# Patient Record
(demographics unavailable — no encounter records)

---

## 2024-10-23 NOTE — HISTORY OF PRESENT ILLNESS
[Current] : current [TextBox_4] : She continues to lose weight.  She has a poor appetite.  She eats multiple small meals every day.  She has a chronic cough, no hemoptysis.  Remains on Trelegy.  No constitutional symptoms.  Has followed up with ID, no specific therapy prescribed as of yet.  [Difficulty Initiating Sleep] : does not have difficulty initiating sleep [Difficulty Maintaining Sleep] : does not have difficulty maintaining sleep

## 2024-10-23 NOTE — DISCUSSION/SUMMARY
[FreeTextEntry1] : She is a 76 year-old smoker with a history of hypertension, hyperlipidemia and severe COPD.   She was found to have pulmonary nodules on a CT of the abdomen done for right lower quadrant pain.  CT chest demonstrated a 1.4 cm spiculated nodule in the RUL and a 1.1 cm spiculated nodule in the left upper lobe.  The nodules were metabolically active on a PET/CT. CT guided biopsy posed a high risk of pneumothorax.  Robotic assisted navigational bronchoscopy was performed on 12/20/22.  The lavage and biopsy were both negative for malignant cells.  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  Since that time sputum specimens have demonstrated other nontuberculous mycobacteria, see the reports. CT Chest 3/28/23: Multiple new nodules noted in the RUL. Also RUL nodule increased to 1.4 cm, was 9 mm. Other nodules, up to 1.0 cm, remain stable. CT Chest 5/18/23: Decrease in right upper lobe opacities.  New 8 mm left upper lobe nodule.  Decrease in 1.1 cm right upper lobe nodule.  Other nodules unchanged in size. CT chest 12/29/2023: Decrease in size of the right upper lobe nodular opacities.  Right upper lobe nodular opacity stable.  Other smaller nodules also stable.  Centrilobular emphysema noted.  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

## 2024-10-23 NOTE — PROCEDURE
[FreeTextEntry1] : PFT 8/10/18:Severe obstruction. There was air trapping. Diffusion was mildly reduced. No significant improvement was noted after inhalation of a bronchodilator.  PFT 4/13/18: There was moderate obstructive airways disease. Improvement was noted after administration of a bronchodilator. There was air trapping. There was a mild reduction in diffusion.  CT Chest 2/24/17: The report indicates a 2 mm granuloma in the left lung base. In addition there were other pulmonary nodules all measuring between 2 and 4 mm. A followup CT at one-year interval was advised.  CT Chest  6/27/18: Multiple small nodules are noted. The largest was 4 mm. A followup examination in 12 months was advised.  CT Chest 6/20/19: A 4 mm nodule in the left upper lobe. Unchanged when compared to the prior. Other small nodules also present.   CT Chest 10/3/20: Moderate to severe emphysematous changes. Small pulmonary nodules, stable.   CT Chest 10/8/22: 1.4 spiculated nodule in the right upper lobe.  1.1 cm spiculated nodule in the left upper lobe.  Moderate to severe emphysematous changes.  CT Chest 12/16/22: 10 mm nodule in the right upper lobe, stable.  8 mm nodule in the left upper lobe, stable new smaller nodules. No adenopathy.   PET/CT 10/25/22: 9 mm spiculated nodule in the right upper lobe, metabolically active.  10 mm nodule in the left upper lobe, metabolically active.  No hilar or mediastinal activity noted.  CT Chest 3/28/23: New nodules noted. RUL increased to 1.4 cm. Other nodules stable.   CT Chest 5/18/23: Decrease in right upper lobe opacities.  New 8 mm left upper lobe nodule.  Decrease in 1.1 cm right upper lobe nodule.  Other nodules unchanged in size.  See report.  CT chest 12/29/2023: Decrease in size of the right upper lobe nodular opacities.  Right upper lobe solid nodular opacity stable.  Other smaller nodules also stable.  Centrilobular emphysema noted.

## 2024-10-23 NOTE — REVIEW OF SYSTEMS
[Cough] : cough [Anxiety] : anxiety [DVT] : DVT [Fever] : no fever [Chills] : no chills [Poor Appetite] : poor appetite [Postnasal Drip] : no postnasal drip [Hemoptysis] : no hemoptysis [Dyspnea] : no dyspnea [Chest Tightness] : no chest tightness [Wheezing] : no wheezing [Chest Discomfort] : no chest discomfort [Edema] : ~T edema was not present [Heartburn] : no heartburn [Reflux] : no reflux [Dysuria] : no dysuria [Back Pain] : ~T no back pain [Depression] : no depression [Diabetes] : no diabetes mellitus [Thyroid Problem] : no thyroid problem

## 2024-10-23 NOTE — PHYSICAL EXAM
[General Appearance - In No Acute Distress] : no acute distress [Heart Rate And Rhythm] : heart rate and rhythm were normal [Heart Sounds] : normal S1 and S2 [Abdomen Tenderness] : non-tender [Abnormal Walk] : normal gait [Nail Clubbing] : no clubbing of the fingernails [Cyanosis, Localized] : no localized cyanosis [Skin Turgor] : normal skin turgor [] : no rash [No Focal Deficits] : no focal deficits [Oriented To Time, Place, And Person] : oriented to person, place, and time [Impaired Insight] : insight and judgment were intact [Affect] : the affect was normal [FreeTextEntry1] : Few scattered rhonchi, no wheezing

## 2024-11-01 NOTE — PHYSICAL EXAM
[2+] : left foot dorsalis pedis 2+ [No Focal Deficits] : no focal deficits [FreeTextEntry3] : CFT: 3 seconds x 10.  Temperature gradient: warm to cool.  [de-identified] : Pain on palpation of the left 5th digit at the PIPJ.  No joint effusion or increase in warmth.  No erythematous discoloration on exam today.   Hammering of the lesser digits 2 through 5, bilaterally.  Bilateral bunion deformity present.  [FreeTextEntry1] : Light touch intact.

## 2024-11-01 NOTE — HISTORY OF PRESENT ILLNESS
[FreeTextEntry1] : Patient returns today for management of painful, elongated onychomycotic nails as well as left 5th digit painful IPK.  Patient is currently undergoing medical workup for unexplained weight loss and night chills.  She is pending a PCP annual exam prior to scheduling for left foot surgery.  Patient states that her skin has been more dry than usual and has been applying Ammonium Lactate.  She has a lot of difficulty walking due to the pain on the left 5th digit.

## 2024-11-01 NOTE — PHYSICAL EXAM
[2+] : left foot dorsalis pedis 2+ [No Focal Deficits] : no focal deficits [FreeTextEntry3] : CFT: 3 seconds x 10.  Temperature gradient: warm to cool.  [de-identified] : Pain on palpation of the left 5th digit at the PIPJ.  No joint effusion or increase in warmth.  No erythematous discoloration on exam today.   Hammering of the lesser digits 2 through 5, bilaterally.  Bilateral bunion deformity present.  [FreeTextEntry1] : Light touch intact.

## 2024-11-01 NOTE — PHYSICAL EXAM
[2+] : left foot dorsalis pedis 2+ [No Focal Deficits] : no focal deficits [FreeTextEntry3] : CFT: 3 seconds x 10.  Temperature gradient: warm to cool.  [de-identified] : Pain on palpation of the left 5th digit at the PIPJ.  No joint effusion or increase in warmth.  No erythematous discoloration on exam today.   Hammering of the lesser digits 2 through 5, bilaterally.  Bilateral bunion deformity present.  [FreeTextEntry1] : Light touch intact.

## 2024-11-01 NOTE — ASSESSMENT
[FreeTextEntry1] : Impression: Onychomycosis, painful (B35.1) (M79.675).  Hammertoe (M20.41) and IPK (L85.1).    Treatment:  The left 5th digit IPK was enucleated with improvement in pain.  Patient was advised to use topical CBD oil as well as over-the-counter Lidocaine cream as needed for symptom management.   Continue to wear soft, well-padded shoe gear.  Avoid barefoot walking.  Patient is pending medical work up prior to scheduling derotational toe surgery on the left 5th toe.   For onychomycosis, nails 1 through 5, bilaterally were debrided of excessive thickness and length to appropriate levels of comfort and contour using sterile nippers and Dremel.   The procedure was tolerated well without complications.  Continue using topical Ciclopirox.   Return: As needed.

## 2024-11-25 NOTE — REVIEW OF SYSTEMS
[Poor Appetite] : poor appetite [Cough] : cough [Anxiety] : anxiety [DVT] : DVT [Fatigue] : fatigue [Dyspnea] : dyspnea [Fever] : no fever [Chills] : no chills [Postnasal Drip] : no postnasal drip [Hemoptysis] : no hemoptysis [Chest Tightness] : no chest tightness [Wheezing] : no wheezing [Chest Discomfort] : no chest discomfort [Edema] : ~T edema was not present [Heartburn] : no heartburn [Reflux] : no reflux [Dysuria] : no dysuria [Back Pain] : ~T no back pain [Depression] : no depression [Diabetes] : no diabetes mellitus [Thyroid Problem] : no thyroid problem

## 2024-11-25 NOTE — DISCUSSION/SUMMARY
[FreeTextEntry1] : She is a 76 year-old smoker with a history of hypertension, hyperlipidemia and severe COPD.   She was found to have pulmonary nodules on a CT of the abdomen done for right lower quadrant pain.  CT chest demonstrated a 1.4 cm spiculated nodule in the RUL and a 1.1 cm spiculated nodule in the left upper lobe.  The nodules were metabolically active on a PET/CT. CT guided biopsy posed a high risk of pneumothorax.  Robotic assisted navigational bronchoscopy was performed on 12/20/22.  The lavage and biopsy were both negative for malignant cells.  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  Since that time sputum specimens have demonstrated other nontuberculous mycobacteria, see the reports. CT Chest 3/28/23: Multiple new nodules noted in the RUL. Also RUL nodule increased to 1.4 cm, was 9 mm. Other nodules, up to 1.0 cm, remain stable. CT Chest 5/18/23: Decrease in right upper lobe opacities.  New 8 mm left upper lobe nodule.  Decrease in 1.1 cm right upper lobe nodule.  Other nodules unchanged in size. CT chest 12/29/2023: Decrease in size of the right upper lobe nodular opacities.  Right upper lobe nodular opacity stable.  Other smaller nodules also stable.  Centrilobular emphysema noted.  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 Fajardo, next week Continue with Trelegy and albuterol as needed Further recommendations to follow

## 2024-11-25 NOTE — HISTORY OF PRESENT ILLNESS
[Former] : former [TextBox_4] : She has been having increasing shortness of breath with minimal exertion.  Her cough has not changed.  She denies any chest pain or tightness.  No palpitations.  She has been eating poorly and has lost weight.  She remains on Trelegy.  She is no longer smoking.  [Difficulty Initiating Sleep] : does not have difficulty initiating sleep [Difficulty Maintaining Sleep] : does not have difficulty maintaining sleep

## 2024-11-25 NOTE — PHYSICAL EXAM
[General Appearance - In No Acute Distress] : no acute distress [Heart Rate And Rhythm] : heart rate and rhythm were normal [Heart Sounds] : normal S1 and S2 [Abdomen Tenderness] : non-tender [Abnormal Walk] : normal gait [Nail Clubbing] : no clubbing of the fingernails [Cyanosis, Localized] : no localized cyanosis [Skin Turgor] : normal skin turgor [] : no rash [No Focal Deficits] : no focal deficits [Oriented To Time, Place, And Person] : oriented to person, place, and time [Affect] : the affect was normal [Impaired Insight] : insight and judgment were intact [Normal Appearance] : normal appearance [Exaggerated Use Of Accessory Muscles For Inspiration] : no accessory muscle use [FreeTextEntry1] : Distant breath sounds, no wheezing

## 2024-12-06 NOTE — PHYSICAL EXAM
[Alert] : alert [Normal Voice/Communication] : normal voice/communication [Healthy Appearing] : healthy appearing [No Acute Distress] : no acute distress [Sclera] : the sclera and conjunctiva were normal [Hearing Threshold Finger Rub Not [place]] : hearing was normal [Normal Lips/Gums] : the lips and gums were normal [Oropharynx] : the oropharynx was normal [Normal Appearance] : the appearance of the neck was normal [No Neck Mass] : no neck mass was observed [No Respiratory Distress] : no respiratory distress [No Acc Muscle Use] : no accessory muscle use [Respiration, Rhythm And Depth] : normal respiratory rhythm and effort [Auscultation Breath Sounds / Voice Sounds] : lungs were clear to auscultation bilaterally [Heart Rate And Rhythm] : heart rate was normal and rhythm regular [Normal S1, S2] : normal S1 and S2 [Murmurs] : no murmurs [Bowel Sounds] : normal bowel sounds [Abdomen Tenderness] : non-tender [No Masses] : no abdominal mass palpated [Abdomen Soft] : soft [] : no hepatosplenomegaly [Oriented To Time, Place, And Person] : oriented to person, place, and time

## 2024-12-06 NOTE — HISTORY OF PRESENT ILLNESS
[FreeTextEntry1] : 77 y/o female presents today for EGD/colon cancer screening. Hx carpal tunnel, rheumatoid arthritis, COPD, 11/2013 ischemic colitis. S/P ileostomy which was reversed 4/2014. Patient reports that her last colonoscopy was approximately 3-4 years ago, states that she is due for repeat procedure. Concerned about recent unintentional weight loss and continued RLQ abdominal pain. Patient states that abdominal pain is near the previous stoma site that was reversed 4/2014. Current smoker, 4 cigarettes per day. Closely follows up with Pulmonologist due to COPD and MAC. Currently on trelegy and albuterol nebulizer. Denies sob, cp and rectal bleeding.   Plan: 1. Schedule EGD/colonoscopy 2. Ordered CT scan abdomen/pelvis with IV contrast.   3. Ordered CBC CMP 4. Follow up results.    [de-identified] : 6/2019: Non-erosive gastritis, non-bleeding erosive gastropathy. Large hiatal hernia. 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

## 2024-12-06 NOTE — ASSESSMENT
[FreeTextEntry1] : 77 y/o female presents today for EGD/colon cancer screening. Hx carpal tunnel, rheumatoid arthritis, COPD, 11/2013 ischemic colitis. S/P ileostomy which was reversed 4/2014. Patient reports that her last colonoscopy was approximately 3-4 years ago, states that she is due for repeat procedure. Concerned about recent unintentional weight loss and continued RLQ abdominal pain. Patient states that abdominal pain is near the previous stoma site that was reversed 4/2014. Current smoker, 4 cigarettes per day. Closely follows up with Pulmonologist due to COPD and MAC. Currently on trelegy and albuterol nebulizer. Denies sob, cp and rectal bleeding.   Plan: 1. Schedule EGD/colonoscopy 2. Ordered CT scan abdomen/pelvis with IV contrast.   3. Ordered CBC CMP 4. Follow up results.   Patient agreed to schedule EGD/colonoscopy procedure. [facility] the assistance of an anesthesiologist. The patient was given a colonoscopy preparation prescription and understood the procedure as it was explained to them. Patient was given a booklet distributed by the [employer] Society of [employer] explaining the procedure in detail and they understood the risks of the procedure not limited to infection, bleeding, perforation or non- diagnosis of colorectal cancer. Patient was advised that they could not drive home, if they choose to receive sedation.  Further diagnostic and treatment recommendations will be based upon the procedure and any biopsies, if they are taken.  Thank you for allowing me to participate in this [facility] care.

## 2025-01-09 NOTE — ASSESSMENT
[FreeTextEntry1] : Labs for celiac sprue and hypothyroidism are drawn Tylenol as needed  Office follow-up in 1 to 2 months   I spent 31 minutes with the patient and her caretaker and answered all of their questions

## 2025-01-09 NOTE — HISTORY OF PRESENT ILLNESS
[FreeTextEntry1] : Recent colonoscopy was normal. The  anastomotic site was seen and traversed  and was normal.  Her endoscopy was also normal.  Routine biopsies of the stomach were negative Helicobacter pylori.  She subsequently had a CAT scan of her abdomen pelvis which was also normal.  She has gained 6 pounds since her last visit in November.  She still complains of intermittent right lower quadrant abdominal pain especially when she moves her body . Her caretaker says that she lifts a lot and possibly this is musculoskeletal.

## 2025-01-09 NOTE — CONSULT LETTER
[Dear  ___] : Dear  [unfilled], [Consult Letter:] : I had the pleasure of evaluating your patient, [unfilled]. [( Thank you for referring [unfilled] for consultation for _____ )] : Thank you for referring [unfilled] for consultation for [unfilled] [Please see my note below.] : Please see my note below. [Consult Closing:] : Thank you very much for allowing me to participate in the care of this patient.  If you have any questions, please do not hesitate to contact me. [Sincerely,] : Sincerely, [FreeTextEntry3] : Sonny Barker MD  Gastroenterology [facility]

## 2025-03-24 NOTE — HISTORY OF PRESENT ILLNESS
[FreeTextEntry1] : She has gained 3 pounds since February.  Recent colonoscopy was normal.  The ileocolonic anastomosis was seen and traversed was normal.  This was located in the sigmoid colon which is why she is having frequent bowel movements as most of her colon is bypassed as this would allow liquids to be absorbed, hence solidifying her  stool.  She is feeling well and has no complaints at this time.

## 2025-03-24 NOTE — ASSESSMENT
[FreeTextEntry1] : High-fiber diet Metamucil 1 tablespoon in 8 ounces of water after dinner Imodium as needed  Office follow-up in 4 to 6 months   I spent 23 minutes with the patient and answered all of her questions

## 2025-03-24 NOTE — CONSULT LETTER
[Dear  ___] : Dear  [unfilled], [Consult Letter:] : I had the pleasure of evaluating your patient, [unfilled]. [( Thank you for referring [unfilled] for consultation for _____ )] : Thank you for referring [unfilled] for consultation for [unfilled] [Please see my note below.] : Please see my note below. [Consult Closing:] : Thank you very much for allowing me to participate in the care of this patient.  If you have any questions, please do not hesitate to contact me. [Sincerely,] : Sincerely, [FreeTextEntry3] : Sonny Barker MD  Gastroenterology [facility]

## 2025-04-05 NOTE — ASSESSMENT
[FreeTextEntry1] : Impression: Onychomycosis, painful (B35.1) (M79.675).  Hammertoe (M20.41) and IPK (L85.1).    Treatment: Advised patient to contact the scheduling of surgery one month prior to the proposed surgical date.   The left 5th digit IPK was enucleated with improvement in pain.  Patient is to continue to use topical pain creams.  Wear well-padded supportive shoe gear.  Avoid barefoot walking.  She can continue to soak her feet for symptomatic management.  She was also prescribed Percocet to be used today, post intervention as the toe is very painful.   For onychomycosis, nails 1 through 5, bilaterally were debrided of excessive thickness and length to appropriate levels of comfort and contour using sterile nippers and Dremel.   The procedure was tolerated well without complications.  Continue using topical Ciclopirox, which was renewed.  Continue to clean fomites.   Return: 3 months for management of onychomycosis.

## 2025-04-05 NOTE — PHYSICAL EXAM
[FreeTextEntry3] : CFT: 3 seconds x 10.  Temperature gradient: warm to cool.  [de-identified] : Pain on palpation of the left 5th digit at the PIPJ.  No joint effusion or increase in warmth.  No erythematous discoloration on exam today.   Hammering of the lesser digits 2 through 5, bilaterally.  Bilateral bunion deformity present.  [FreeTextEntry1] : Light touch intact.

## 2025-04-05 NOTE — PHYSICAL EXAM
[FreeTextEntry3] : CFT: 3 seconds x 10.  Temperature gradient: warm to cool.  [de-identified] : Pain on palpation of the left 5th digit at the PIPJ.  No joint effusion or increase in warmth.  No erythematous discoloration on exam today.   Hammering of the lesser digits 2 through 5, bilaterally.  Bilateral bunion deformity present.  [FreeTextEntry1] : Light touch intact.

## 2025-04-05 NOTE — PHYSICAL EXAM
[FreeTextEntry3] : CFT: 3 seconds x 10.  Temperature gradient: warm to cool.  [de-identified] : Pain on palpation of the left 5th digit at the PIPJ.  No joint effusion or increase in warmth.  No erythematous discoloration on exam today.   Hammering of the lesser digits 2 through 5, bilaterally.  Bilateral bunion deformity present.  [FreeTextEntry1] : Light touch intact.

## 2025-04-05 NOTE — HISTORY OF PRESENT ILLNESS
[FreeTextEntry1] : Patient returns today for management of painful onychomycotic nails as well as left 5th digit painful IPK.  Patient is currently undergoing medical workup for MAC with Infectious Disease from chronic cough.  Patient is interested in surgical intervention.  She has been using topical Ciclopirox on the mycotic nails and reports some improvement in color.  Her left 5th digit IPK has recurred and is causing her significant pain when she walks.  Patient denies any new medical changes.

## 2025-05-06 NOTE — HISTORY OF PRESENT ILLNESS
[FreeTextEntry1] : This is a 77 yo F with RA, HLD, HTN, severe COPD presents today after found to have lung nodules and underwent bronchoscopy and culture + MAC.  Initially found to have lung nodules on ct a/p done for RLQ pain.    CT chest 12/16/22: Stable lung nodules largest 10x9 mm in RUL. Couple of new sub 5 mm lung nodules.  Initialy CT done 10/2022 had a spiculated nodule RUL and second one LUZMARIA 1.1 cm which prompted biopsy.    Had Bronch 12/20/2022 with FNA: Results: 1) RUL Transbronchial forceps bx and FNA - non-diagnostic Cytology reveal a cellular specimen composed of abundant benign ciliated bronchial cells, alveolar macrophages, and scattered pneumocytes.  Fragments of benign lung parenchyma 2) RUL Bronchalveolar lavage: negative for malignant cells  There were no granulomas noted.   Culture from bronch has MAC and haemophilus.  New info 4/3/2023: +cough, not feeling well, +SOB Had f/up ct chest with new opacities feels chills Sputum 2/2023 with pseudomonas  New info 3/12/24 1 cat and 1 dog passed away. She has been sad from this Increased cough. Not given abx yet. Did repeat AFB cultures and did not have MAC, had M. terrae and m. gordonae  CT chest done 12/2023 and was relatively stable compared to prior.  RUL opacity decreased, severe emphysema noted, LUZMARIA nodule stable or slightly decreased, No change in RUL pulm nodular density.    2/10/25: Increased cough.  CT chest done 1/29/25: Increase in the size of several RUL nodular opacities likely reflecting sequela of chronic inflammation possibly from atypical mycobacterial organisms.  New clustered centrilobular nodules w/in RML. Rec short term f/up 2-3 months.   Few LUZMARIA pulm nodules, stable. Moderate coronary artery calcifications.   5/6/25: Not feeling well lately. Noted rodent feces in her home and mold around the house. Wondering if this is why she doesn't feel well. Rents and does not own her home so cannot remove the mold. Gave in new sputum. Bacterial cultures negative. Some penicillium noted which she has had before and this is likely colonization given her bronchiectasis. AFB cultures have gorwn M Terrae in the recent past. Awaiting these cultures now. Feels sob at times. Not using hypersal bid, mostly once a day but i wonder if she does it every day.

## 2025-05-06 NOTE — CONSULT LETTER
[Dear  ___] : Dear  [unfilled], [Consult Letter:] : I had the pleasure of evaluating your patient, [unfilled]. [Please see my note below.] : Please see my note below. [Consult Closing:] : Thank you very much for allowing me to participate in the care of this patient.  If you have any questions, please do not hesitate to contact me. [Sincerely,] : Sincerely, [FreeTextEntry2] : Dr. Ryley Lowery [FreeTextEntry3] : \par  Laurita Russo MD\par   of Medicine\par  Division of Infectious Diseases\par  [facility]\par  [address] [place] [address] DrStephanie\[place] [address]\par  Tel: [phone]\par  Fax: [phone]

## 2025-05-06 NOTE — PHYSICAL EXAM
[General Appearance - Alert] : alert [General Appearance - In No Acute Distress] : in no acute distress [Sclera] : the sclera and conjunctiva were normal [Outer Ear] : the ears and nose were normal in appearance [Neck Appearance] : the appearance of the neck was normal [] : no respiratory distress [FreeTextEntry1] : rales noted bases [Heart Rate And Rhythm] : heart rate was normal and rhythm regular [Heart Sounds] : normal S1 and S2 [Bowel Sounds] : normal bowel sounds [Abdomen Soft] : soft [Skin Lesions] : no skin lesions [No Focal Deficits] : no focal deficits [Oriented To Time, Place, And Person] : oriented to person, place, and time [Affect] : the affect was normal

## 2025-05-06 NOTE — ASSESSMENT
[FreeTextEntry1] : This is a 77 yo F with RA, HLD, HTN, severe COPD presents today after found to have lung nodules and underwent bronchoscopy and culture + MAC.  I did review her ct chest with radiology and there is no clear findings of MAC or NTM disease.  There was no bronchiectasis noted when reviewed with radiology.  New ct chest done 1/2025 does note emphysema and bronchiectasis with increase in nodular opacities. She was treated with cefdinir 2/2025.  I favored trying to collect  more sputum first which was done. AFB did not grow but it did reveal pseudomonas.    Repeat CT chest was done 3/28/2023.  Has some new opacities on right. Given levaquin 500 mg po daily x 10 days since pt with worse cough, new findings on ct chest and is not feeling well.   .  3/12/2024 returns with cough. CT chest 12/2023 relatively stable.  AFB with M. terrae and M. gordonae. Not typical organisms that need to be treated. Again i favor course of levaquin x 10 days for routine bacterial infection.  Will repeat AFB x 3 again to see if MAC grows.    2/10/25: Returns again increased cough. Sputum with pomymicrobial fungal organisms. I think she really needs to do good mucus clearance.  Given hypersal script to do with albuterol Repeat sputum x 3 Short course cefdinir for cough.  5/6/25: Now not feeling well with concerns of exposure to rodents. Will give doxy 100 mg po bid x 10 days. Again encouraged pt to really use mucus clearance. Explained there is no cure for emphysema or bronchiectasis. Needs to be following recs from myself and pulm closely.  She does express understanding and i hope she increased the nebulizer use with hypertonic saline to BID.  RTC 1 month for close f/up and to review AFB cultures which are pending.

## 2025-06-09 NOTE — PHYSICAL EXAM
[Normal Appearance] : normal appearance [General Appearance - In No Acute Distress] : no acute distress [Heart Rate And Rhythm] : heart rate and rhythm were normal [Heart Sounds] : normal S1 and S2 [Exaggerated Use Of Accessory Muscles For Inspiration] : no accessory muscle use [Abdomen Tenderness] : non-tender [Abnormal Walk] : normal gait [Nail Clubbing] : no clubbing of the fingernails [Skin Turgor] : normal skin turgor [Cyanosis, Localized] : no localized cyanosis [] : no rash [No Focal Deficits] : no focal deficits [Oriented To Time, Place, And Person] : oriented to person, place, and time [Impaired Insight] : insight and judgment were intact [Affect] : the affect was normal [Murmurs] : no murmurs present [FreeTextEntry1] : Distant breath sounds, no wheezing

## 2025-06-09 NOTE — HISTORY OF PRESENT ILLNESS
[Former] : former [TextBox_4] : 6/9/2025: She had COVID a few weeks ago.  She did not tolerate Paxlovid.  She is feeling better but continues to cough.  No hemoptysis.  She has difficulty producing mucus.  She also complains of shortness of breath especially when waking up in the morning.  She improves after her nebulizer treatments.  Remains on Trelegy.  Is due to see cardiology later this week and ID next week.  She continues to smoke, a few cigarettes per day now.  [Difficulty Initiating Sleep] : does not have difficulty initiating sleep [Difficulty Maintaining Sleep] : does not have difficulty maintaining sleep

## 2025-06-09 NOTE — DISCUSSION/SUMMARY
[FreeTextEntry1] : She is a 76 year-old smoker with a history of hypertension, hyperlipidemia and severe COPD.   She was found to have pulmonary nodules on a CT of the abdomen done for right lower quadrant pain.  CT chest demonstrated a 1.4 cm spiculated nodule in the RUL and a 1.1 cm spiculated nodule in the left upper lobe.  The nodules were metabolically active on a PET/CT. CT guided biopsy posed a high risk of pneumothorax.  Robotic assisted navigational bronchoscopy was performed on 12/20/22.  The lavage and biopsy were both negative for malignant cells.  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  Since that time sputum specimens have demonstrated other nontuberculous mycobacteria, see the reports. CT Chest 3/28/23: Multiple new nodules noted in the RUL. Also RUL nodule increased to 1.4 cm, was 9 mm. Other nodules, up to 1.0 cm, remain stable. CT Chest 5/18/23: Decrease in right upper lobe opacities.  New 8 mm left upper lobe nodule.  Decrease in 1.1 cm right upper lobe nodule.  Other nodules unchanged in size. CT chest 12/29/2023: Decrease in size of the right upper lobe nodular opacities.  Right upper lobe nodular opacity stable.  Other smaller nodules also stable.  Centrilobular emphysema noted.  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 -Has not wanted treatment  Recommend  CT suggested -She agreed to have it performed Repeat sputum if obtainable Continue with nebulizer treatment -Albuterol and hypertonic saline Continue with Trelegy and albuterol as needed Consider chest PT -Will try and obtain oscillating vest Cardiology consultation, later this week ID follow-up next week F/U with PCP for possible depression I will see her again in 1 month   Time spent reviewing the chart prior to the visit, interviewing and examining the patient, reviewing data, imaging and performing documentation was 45 minutes excluding billable services.

## 2025-06-09 NOTE — REVIEW OF SYSTEMS
[Fatigue] : fatigue [Poor Appetite] : poor appetite [Cough] : cough [Dyspnea] : dyspnea [Anxiety] : anxiety [DVT] : DVT [Sputum] : sputum  [Fever] : no fever [Postnasal Drip] : no postnasal drip [Hemoptysis] : no hemoptysis [Wheezing] : no wheezing [Chest Tightness] : no chest tightness [Edema] : ~T edema was not present [Chest Discomfort] : no chest discomfort [Heartburn] : no heartburn [Back Pain] : ~T no back pain [Dysuria] : no dysuria [Depression] : no depression [Thyroid Problem] : no thyroid problem [Diabetes] : no diabetes mellitus

## 2025-06-09 NOTE — PROCEDURE
[FreeTextEntry1] : PFT 8/10/18:Severe obstruction. There was air trapping. Diffusion was mildly reduced. No significant improvement was noted after inhalation of a bronchodilator.  PFT 4/13/18: There was moderate obstructive airways disease. Improvement was noted after administration of a bronchodilator. There was air trapping. There was a mild reduction in diffusion.  CT Chest 2/24/17: The report indicates a 2 mm granuloma in the left lung base. In addition there were other pulmonary nodules all measuring between 2 and 4 mm. A followup CT at one-year interval was advised.  CT Chest  6/27/18: Multiple small nodules are noted. The largest was 4 mm. A followup examination in 12 months was advised.  CT Chest 6/20/19: A 4 mm nodule in the left upper lobe. Unchanged when compared to the prior. Other small nodules also present.   CT Chest 10/3/20: Moderate to severe emphysematous changes. Small pulmonary nodules, stable.   CT Chest 10/8/22: 1.4 spiculated nodule in the right upper lobe.  1.1 cm spiculated nodule in the left upper lobe.  Moderate to severe emphysematous changes.  CT Chest 12/16/22: 10 mm nodule in the right upper lobe, stable.  8 mm nodule in the left upper lobe, stable new smaller nodules. No adenopathy.   PET/CT 10/25/22: 9 mm spiculated nodule in the right upper lobe, metabolically active.  10 mm nodule in the left upper lobe, metabolically active.  No hilar or mediastinal activity noted.  CT Chest 3/28/23: New nodules noted. RUL increased to 1.4 cm. Other nodules stable.   CT Chest 5/18/23: Decrease in right upper lobe opacities.  New 8 mm left upper lobe nodule.  Decrease in 1.1 cm right upper lobe nodule.  Other nodules unchanged in size.  See report.  CT chest 12/29/2023: Decrease in size of the right upper lobe nodular opacities.  Right upper lobe solid nodular opacity stable.  Other smaller nodules also stable.  Centrilobular emphysema noted.  CT chest 1/29/2025: Increase in size of the right upper lobe opacities, most likely due to inflammation.  New centrilobular nodules in the right middle lobe.  Left upper lobe nodule stable.  Coronary artery calcifications.

## 2025-06-09 NOTE — REVIEW OF SYSTEMS
[Fatigue] : fatigue [Poor Appetite] : poor appetite [Cough] : cough [Dyspnea] : dyspnea [Anxiety] : anxiety [DVT] : DVT [Sputum] : sputum  [Fever] : no fever [Postnasal Drip] : no postnasal drip [Hemoptysis] : no hemoptysis [Chest Tightness] : no chest tightness [Wheezing] : no wheezing [Edema] : ~T edema was not present [Chest Discomfort] : no chest discomfort [Heartburn] : no heartburn [Back Pain] : ~T no back pain [Dysuria] : no dysuria [Depression] : no depression [Diabetes] : no diabetes mellitus [Thyroid Problem] : no thyroid problem

## 2025-06-09 NOTE — PHYSICAL EXAM
[General Appearance - In No Acute Distress] : no acute distress [Normal Appearance] : normal appearance [Heart Rate And Rhythm] : heart rate and rhythm were normal [Heart Sounds] : normal S1 and S2 [Exaggerated Use Of Accessory Muscles For Inspiration] : no accessory muscle use [Abdomen Tenderness] : non-tender [Nail Clubbing] : no clubbing of the fingernails [Abnormal Walk] : normal gait [Cyanosis, Localized] : no localized cyanosis [Skin Turgor] : normal skin turgor [] : no rash [No Focal Deficits] : no focal deficits [Oriented To Time, Place, And Person] : oriented to person, place, and time [Impaired Insight] : insight and judgment were intact [Affect] : the affect was normal [Murmurs] : no murmurs present [FreeTextEntry1] : Distant breath sounds, no wheezing

## 2025-07-07 NOTE — REVIEW OF SYSTEMS
[Poor Appetite] : poor appetite [Cough] : cough [Dyspnea] : dyspnea [Anxiety] : anxiety [DVT] : DVT [Fever] : no fever [Fatigue] : no fatigue [Postnasal Drip] : no postnasal drip [Sputum] : not coughing up ~M sputum [Hemoptysis] : no hemoptysis [Chest Tightness] : no chest tightness [Wheezing] : no wheezing [Chest Discomfort] : no chest discomfort [Edema] : ~T edema was not present [Heartburn] : no heartburn [Dysuria] : no dysuria [Back Pain] : ~T no back pain [Depression] : no depression [Diabetes] : no diabetes mellitus [Thyroid Problem] : no thyroid problem

## 2025-07-07 NOTE — PROCEDURE
[FreeTextEntry1] : PFT 8/10/18:Severe obstruction. There was air trapping. Diffusion was mildly reduced. No significant improvement was noted after inhalation of a bronchodilator.  PFT 4/13/18: There was moderate obstructive airways disease. Improvement was noted after administration of a bronchodilator. There was air trapping. There was a mild reduction in diffusion.  CT Chest 2/24/17: The report indicates a 2 mm granuloma in the left lung base. In addition there were other pulmonary nodules all measuring between 2 and 4 mm. A followup CT at one-year interval was advised.  CT Chest  6/27/18: Multiple small nodules are noted. The largest was 4 mm. A followup examination in 12 months was advised.  CT Chest 6/20/19: A 4 mm nodule in the left upper lobe. Unchanged when compared to the prior. Other small nodules also present.   CT Chest 10/3/20: Moderate to severe emphysematous changes. Small pulmonary nodules, stable.   CT Chest 10/8/22: 1.4 spiculated nodule in the right upper lobe.  1.1 cm spiculated nodule in the left upper lobe.  Moderate to severe emphysematous changes.  CT Chest 12/16/22: 10 mm nodule in the right upper lobe, stable.  8 mm nodule in the left upper lobe, stable new smaller nodules. No adenopathy.   PET/CT 10/25/22: 9 mm spiculated nodule in the right upper lobe, metabolically active.  10 mm nodule in the left upper lobe, metabolically active.  No hilar or mediastinal activity noted.  CT Chest 3/28/23: New nodules noted. RUL increased to 1.4 cm. Other nodules stable.   CT Chest 5/18/23: Decrease in right upper lobe opacities.  New 8 mm left upper lobe nodule.  Decrease in 1.1 cm right upper lobe nodule.  Other nodules unchanged in size.  See report.  CT chest 12/29/2023: Decrease in size of the right upper lobe nodular opacities.  Right upper lobe solid nodular opacity stable.  Other smaller nodules also stable.  Centrilobular emphysema noted.  CT chest 1/29/2025: Increase in size of the right upper lobe opacities, most likely due to inflammation.  New centrilobular nodules in the right middle lobe.  Left upper lobe nodule stable.  Coronary artery calcifications.  CT 6/12/2025: Multiple bilateral pulmonary nodules, not significantly changed except a new 10 mm nodule in the left upper lobe.  Severe emphysematous changes.

## 2025-07-07 NOTE — DISCUSSION/SUMMARY
[FreeTextEntry1] : She is a 76 year-old smoker with a history of hypertension, hyperlipidemia and severe COPD.   She was found to have pulmonary nodules on a CT of the abdomen done for right lower quadrant pain.  CT chest demonstrated a 1.4 cm spiculated nodule in the RUL and a 1.1 cm spiculated nodule in the left upper lobe.  The nodules were metabolically active on a PET/CT. CT guided biopsy posed a high risk of pneumothorax.  Robotic assisted navigational bronchoscopy was performed on 12/20/22.  The lavage and biopsy were both negative for malignant cells.  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  Since that time sputum specimens have demonstrated other nontuberculous mycobacteria, see the reports. CT Chest 3/28/23: Multiple new nodules noted in the RUL. Also RUL nodule increased to 1.4 cm, was 9 mm. Other nodules, up to 1.0 cm, remain stable. CT Chest 5/18/23: Decrease in right upper lobe opacities.  New 8 mm left upper lobe nodule.  Decrease in 1.1 cm right upper lobe nodule.  Other nodules unchanged in size. CT chest 12/29/2023: Decrease in size of the right upper lobe nodular opacities.  Right upper lobe nodular opacity stable.  Other smaller nodules also stable.  Centrilobular emphysema noted. CT chest 1/29/2025: Increase in size of the right upper lobe opacities, most likely due to inflammation.  New centrilobular nodules in the right middle lobe.  Left upper lobe nodule stable.  Coronary artery calcifications.  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

## 2025-07-07 NOTE — HISTORY OF PRESENT ILLNESS
[Former] : former [TextBox_4] : 6/9/2025: She had COVID a few weeks ago.  She did not tolerate Paxlovid.  She is feeling better but continues to cough.  No hemoptysis.  She has difficulty producing mucus.  She also complains of shortness of breath especially when waking up in the morning.  She improves after her nebulizer treatments.  Remains on Trelegy.  Is due to see cardiology later this week and ID next week.  She continues to smoke, a few cigarettes per day now.  7/7/2025: Patient was hospitalized at [facility] for chest pain.  She was admitted on 6/13/2025 and discharged on 6/17/2025.  A CT coronary angiogram was performed, and it showed a severe amount of coronary plaque with mild stenosis of the RCA and mild stenosis of the LAD.  No intervention was performed.  Medical therapy was recommended.  She is due to follow-up with cardiology, as an outpatient, next week.  The CT also demonstrated moderate emphysematous changes, no nodules, no masses or consolidation.  See report.  [Difficulty Initiating Sleep] : does not have difficulty initiating sleep [Difficulty Maintaining Sleep] : does not have difficulty maintaining sleep

## 2025-07-07 NOTE — PHYSICAL EXAM
[Normal Appearance] : normal appearance [General Appearance - In No Acute Distress] : no acute distress [Heart Rate And Rhythm] : heart rate and rhythm were normal [Heart Sounds] : normal S1 and S2 [Murmurs] : no murmurs present [Abdomen Tenderness] : non-tender [Abnormal Walk] : normal gait [Nail Clubbing] : no clubbing of the fingernails [Cyanosis, Localized] : no localized cyanosis [Skin Turgor] : normal skin turgor [] : no rash [No Focal Deficits] : no focal deficits [Oriented To Time, Place, And Person] : oriented to person, place, and time [Impaired Insight] : insight and judgment were intact [Affect] : the affect was normal [Auscultation Breath Sounds / Voice Sounds] : lungs were clear to auscultation bilaterally